# Patient Record
Sex: FEMALE | URBAN - METROPOLITAN AREA
[De-identification: names, ages, dates, MRNs, and addresses within clinical notes are randomized per-mention and may not be internally consistent; named-entity substitution may affect disease eponyms.]

---

## 2017-08-04 ENCOUNTER — APPOINTMENT (RX ONLY)
Dept: URBAN - METROPOLITAN AREA CLINIC 77 | Facility: CLINIC | Age: 27
Setting detail: DERMATOLOGY
End: 2017-08-04

## 2017-08-04 DIAGNOSIS — L64.8 OTHER ANDROGENIC ALOPECIA: ICD-10-CM

## 2017-08-04 DIAGNOSIS — L21.8 OTHER SEBORRHEIC DERMATITIS: ICD-10-CM

## 2017-08-04 PROCEDURE — ? COUNSELING

## 2017-08-04 PROCEDURE — 99203 OFFICE O/P NEW LOW 30 MIN: CPT

## 2017-08-04 PROCEDURE — ? TREATMENT REGIMEN

## 2017-08-04 PROCEDURE — ? PRESCRIPTION

## 2017-08-04 RX ORDER — SPIRONOLACTONE 100 MG/1
TABLET, FILM COATED ORAL
Qty: 30 | Refills: 5 | Status: ERX | COMMUNITY
Start: 2017-08-04

## 2017-08-04 RX ORDER — CLOBETASOL PROPIONATE 0.05 G/100ML
SHAMPOO TOPICAL
Qty: 1 | Refills: 5 | Status: ERX | COMMUNITY
Start: 2017-08-04

## 2017-08-04 RX ADMIN — SPIRONOLACTONE: 100 TABLET, FILM COATED ORAL at 00:00

## 2017-08-04 RX ADMIN — CLOBETASOL PROPIONATE: 0.05 SHAMPOO TOPICAL at 00:00

## 2017-08-04 ASSESSMENT — LOCATION DETAILED DESCRIPTION DERM
LOCATION DETAILED: RIGHT MEDIAL FRONTAL SCALP
LOCATION DETAILED: POSTERIOR MID-PARIETAL SCALP

## 2017-08-04 ASSESSMENT — LOCATION ZONE DERM: LOCATION ZONE: SCALP

## 2017-08-04 ASSESSMENT — LOCATION SIMPLE DESCRIPTION DERM
LOCATION SIMPLE: RIGHT SCALP
LOCATION SIMPLE: POSTERIOR SCALP

## 2017-08-04 NOTE — PROCEDURE: TREATMENT REGIMEN
Detail Level: Zone
Plan: Location: scalp\\nPrescribe: Spironolactone 100mg po qd x 30 days\\n                 Clobetasol shampoo qd x 30 days\\n\\nPt is here for diffuse thinning hair.\\nPt discussed that she had been on birth control for years now and stopped recently.\\nPt discussed that her hair started to fall out more than normal.\\nPt states that is starting to normalize again, but still thinks her hair is getting thinner.\\nPt also state that she stopped eating dairy and thinks her hair has had lesser flare ups.\\nPt states that she is not stressed or any hormonal treatment.\\nDiscussed with pt that we will start her on Spironolactone that helps block the receptors from attacking her skin.\\nDiscussed with pt that this is diuretic so she may urinate more frequently.\\nAlso today there is erythema and flaking on scalp as well.\\nDiscussed that we will prescribe Clobetasol shamppo to use once a week\\nPictures taken.\\nF/u as needed
Plan: Location: scalp \\nPrescribe: Clobetasol 0.05 % shampoo top (5-10 minutes) once a week x 30 days\\n\\nDiscussed with patient that this irritation, dry scaly rash is an immune mediated condition that can be triggered or exacerbated by several factors such as lack of sleep, stress, allergies, or illness. There is also a genetic component which we discussed can be helped by taking good care of the skin with a barrier cream and avoiding harsh products.\\nWill prescribe Clobetasol 0.05 % shampoo top (5-10 minutes) once a week x 30 days to help relieve inflammation.\\nF/u 2-3 months